# Patient Record
Sex: MALE | Race: OTHER | HISPANIC OR LATINO | URBAN - METROPOLITAN AREA
[De-identification: names, ages, dates, MRNs, and addresses within clinical notes are randomized per-mention and may not be internally consistent; named-entity substitution may affect disease eponyms.]

---

## 2017-11-03 ENCOUNTER — EMERGENCY (EMERGENCY)
Facility: HOSPITAL | Age: 27
LOS: 1 days | Discharge: HOME | End: 2017-11-03
Attending: EMERGENCY MEDICINE | Admitting: EMERGENCY MEDICINE
Payer: MEDICAID

## 2017-11-03 VITALS
RESPIRATION RATE: 18 BRPM | TEMPERATURE: 99 F | OXYGEN SATURATION: 99 % | DIASTOLIC BLOOD PRESSURE: 78 MMHG | HEART RATE: 84 BPM | SYSTOLIC BLOOD PRESSURE: 132 MMHG

## 2017-11-03 DIAGNOSIS — L02.511 CUTANEOUS ABSCESS OF RIGHT HAND: ICD-10-CM

## 2017-11-03 DIAGNOSIS — M79.89 OTHER SPECIFIED SOFT TISSUE DISORDERS: ICD-10-CM

## 2017-11-03 LAB
ALBUMIN SERPL ELPH-MCNC: 4.6 G/DL — SIGNIFICANT CHANGE UP (ref 3.3–5)
ALP SERPL-CCNC: 55 U/L — SIGNIFICANT CHANGE UP (ref 40–120)
ALT FLD-CCNC: 22 U/L RC — SIGNIFICANT CHANGE UP (ref 10–45)
ANION GAP SERPL CALC-SCNC: 15 MMOL/L — SIGNIFICANT CHANGE UP (ref 5–17)
APTT BLD: 26.9 SEC — LOW (ref 27.5–37.4)
AST SERPL-CCNC: 19 U/L — SIGNIFICANT CHANGE UP (ref 10–40)
BILIRUB SERPL-MCNC: 0.3 MG/DL — SIGNIFICANT CHANGE UP (ref 0.2–1.2)
BUN SERPL-MCNC: 12 MG/DL — SIGNIFICANT CHANGE UP (ref 7–23)
CALCIUM SERPL-MCNC: 9.2 MG/DL — SIGNIFICANT CHANGE UP (ref 8.4–10.5)
CHLORIDE SERPL-SCNC: 99 MMOL/L — SIGNIFICANT CHANGE UP (ref 96–108)
CO2 SERPL-SCNC: 25 MMOL/L — SIGNIFICANT CHANGE UP (ref 22–31)
CREAT SERPL-MCNC: 0.8 MG/DL — SIGNIFICANT CHANGE UP (ref 0.5–1.3)
GLUCOSE SERPL-MCNC: 149 MG/DL — HIGH (ref 70–99)
HCT VFR BLD CALC: 37.4 % — LOW (ref 39–50)
HGB BLD-MCNC: 10.8 G/DL — LOW (ref 13–17)
INR BLD: 1.29 RATIO — HIGH (ref 0.88–1.16)
MCHC RBC-ENTMCNC: 28.7 GM/DL — LOW (ref 32–36)
MCHC RBC-ENTMCNC: 30.5 PG — SIGNIFICANT CHANGE UP (ref 27–34)
MCV RBC AUTO: 106 FL — HIGH (ref 80–100)
POTASSIUM SERPL-MCNC: 3.6 MMOL/L — SIGNIFICANT CHANGE UP (ref 3.5–5.3)
POTASSIUM SERPL-SCNC: 3.6 MMOL/L — SIGNIFICANT CHANGE UP (ref 3.5–5.3)
PROT SERPL-MCNC: 8.2 G/DL — SIGNIFICANT CHANGE UP (ref 6–8.3)
PROTHROM AB SERPL-ACNC: 14 SEC — HIGH (ref 9.8–12.7)
RBC # BLD: 3.52 M/UL — LOW (ref 4.2–5.8)
RBC # FLD: 11.2 % — SIGNIFICANT CHANGE UP (ref 10.3–14.5)
SODIUM SERPL-SCNC: 139 MMOL/L — SIGNIFICANT CHANGE UP (ref 135–145)
WBC # BLD: 8.3 K/UL — SIGNIFICANT CHANGE UP (ref 3.8–10.5)
WBC # FLD AUTO: 8.3 K/UL — SIGNIFICANT CHANGE UP (ref 3.8–10.5)

## 2017-11-03 PROCEDURE — 99284 EMERGENCY DEPT VISIT MOD MDM: CPT

## 2017-11-03 PROCEDURE — 73130 X-RAY EXAM OF HAND: CPT | Mod: 26,RT

## 2017-11-03 RX ORDER — MORPHINE SULFATE 50 MG/1
4 CAPSULE, EXTENDED RELEASE ORAL ONCE
Qty: 0 | Refills: 0 | Status: DISCONTINUED | OUTPATIENT
Start: 2017-11-03 | End: 2017-11-03

## 2017-11-03 NOTE — ED PROVIDER NOTE - OBJECTIVE STATEMENT
26 yo R-handed M no PMHx p/w right index finger swelling, erythema x 4 days. Pt pulled an ingrown hair out of the base of his right index finger about 3-4 days ago and the finger progressively became more red, swollen, and tender. It is painful when he tries to extend the digit. He went to an outpatient clinic today that directed him to the [facility]. Basic labs were drawn and IV antibiotics were given (Unasyn and Vancomycin) and pt was transferred to [facility] for further evaluation. Pt was also given the tetanus vaccine. He states he may have had a fever last PM but denies N/V, CP/palpitations, SOB, abd pain. 28 yo R-handed M no PMHx p/w right index finger swelling, erythema x 4 days. Pt pulled an ingrown hair out of the base of his right index finger about 3-4 days ago and the finger progressively became more red, swollen, and tender. It is painful when he tries to extend the digit. He went to an outpatient clinic today that directed him to the [facility]. Basic labs were drawn and IV antibiotics were given (Unasyn and Vancomycin @ 18:00) and pt was transferred to [facility] for further evaluation. Pt was also given the tetanus vaccine. He states he may have had a fever last PM but denies N/V, CP/palpitations, SOB, abd pain. 26 yo R-handed M no PMHx p/w right index finger swelling, erythema x 4 days. Pt pulled an ingrown hair out of the base of his right index finger about 3-4 days ago and the finger progressively became more red, swollen, and tender. It is painful when he tries to extend the digit. He went to an outpatient clinic today that directed him to the [facility]. Basic labs were drawn and IV antibiotics were given (Unasyn and Vancomycin @ 18:00) and pt was transferred to [facility] for further evaluation. Pt was also given the tetanus vaccine. He denies fever, denies N/V, CP/palpitations, SOB, abd pain.

## 2017-11-03 NOTE — ED ADULT NURSE NOTE - OBJECTIVE STATEMENT
27 year old male presented to ED via EMS from [facility] with c/o of right index finger swelling/cellulitis. Pt had vancomycin and ampicillin at [facility]. Pt afebrile, denies chills, dizziness, nausea/vomiting, numbness/tingling, headache. Pt a&ox3, lung sounds clear, heart rate regular, abdomen soft nontender nondistended to palp. Skin on right index finger red, swollen, palpable pulses, limited ROM. IV in left AC 20G and patent placed by [facility]. Will continue to monitor and assess while offering support and reassurance.

## 2017-11-03 NOTE — ED PROVIDER NOTE - PHYSICAL EXAMINATION
Gen: AAOx3, non-toxic  Head: NCAT  HEENT: EOMI, oral mucosa moist, normal conjunctiva  Lung: CTAB, no respiratory distress, no wheezes/rhonchi/rales B/L, speaking in full sentences  CV: RRR, no murmurs, rubs or gallops  Abd: soft, NTND, no guarding  MSK: R index finger tender and erythematous, pain with ROM at MCP and PIP joint, pain with passive extension  Neuro: No focal sensory or motor deficits  Skin: Warm, well perfused, no rash, R hand index finger with circumferential swelling, ulcered wound at base of finger on dorsal side  Psych: normal affect.   ~Anupama Lopez M.D. Resident

## 2017-11-03 NOTE — ED PROVIDER NOTE - NS ED ROS FT
GENERAL: +fevers/chills, EYES: no change in vision, HEENT: no trouble swallowing or speaking, CARDIAC: no chest pain, PULMONARY: no cough or SOB, GI: no abdominal pain, no nausea, no vomiting, no diarrhea or constipation, : No changes in urination, SKIN: no rashes, NEURO: no headache,  MSK: R hand swelling and pain of index finger ~Anupama Lopez M.D. Resident

## 2017-11-03 NOTE — ED PROVIDER NOTE - ATTENDING CONTRIBUTION TO CARE
Attending MD Beyer: I personally have seen and examined this patient.  Resident note reviewed and agree on plan of care and except where noted.  See below for details.     27M with no reported PMH/PSH/Meds/Allergies presents to the ED with R index finger pain and swelling.  R hand dominant.  Reports on Sunday noticed a white bump on the dorsal aspect of hand at base of index finger.  The following day noted start of redness.  Since then worsening.  Reports today decided to go to Urgent Care that sent him to the ED at [facility].  Review of EMR there reveals given TDaP, Unasyn and Vanco and transfer here (accepted by Dr. Guillermo Loyd) for hand evaluation.  Reports pain with attempts at both flexing finger and extending finger.  Denies animal bite or human bite.  Denies trauma or previous episode.  Denies fevers, chills, dizziness, weakness. Denies tracking up the arm, pain with motion of the wrist or elbow.  Reports spontaneous drainage of purulence from area.  Denies chest pain, shortness of breath, palpitations. Denies abdominal pain, nausea, vomiting, diarrhea, blood in stools.  On exam, NAD, NCAT, +S1S2, no m/r/g, lungs CTAB, abdomen soft, NT, +R index finger +tenderness to palpation, erythema over entire finger more pronounced proximal to DIP but including MCP and area around MCP, +tenderness with ROM at MCP and PIP, pain with passive ROM as well, edema circumferentially between PIP and MCP, wound with purulent drainage over the dorsal aspect of finger between MCP and PIP, no active bleeding, cap refill <2 sec, sensory grossly intact; A/P: 27M with abscess and cellulitis of R index fingers, will obtain labs, hand consult, abx, reassess

## 2017-11-03 NOTE — ED PROVIDER NOTE - MEDICAL DECISION MAKING DETAILS
26 yo R-handed M no PMHx p/w right index finger swelling, erythema x 4 days, DDx: dactylitis, flexor tenosynovitis, cellulitis, will obtain basic labs, cultures, X-ray, Hand Consult, reevaluate

## 2017-11-03 NOTE — ED ADULT NURSE NOTE - CHPI ED SYMPTOMS NEG
no fever/no nausea/no vomiting/no dizziness/no decreased eating/drinking/no numbness/no tingling/no weakness/no chills

## 2017-11-03 NOTE — ED PROVIDER NOTE - PROGRESS NOTE DETAILS
Spoke to Dr. Ricardo Loyd, he is aware pt will need to be seen and evaluated in the ED prior to admission.  Anupama Lopez M.D. Resident Dr. Loyd at bedside.  Anupama Lopez M.D. Resident Attending MD Beyer: Discussed completed procedure with Dr. Loyd.  Reports wound was opened with 0.5cc purulence expressed, wound culture sent, packed with 4"x4" gauze.  Recommended 2nd dose of IV antibiotic at 2am (Vanco) and discharge with oral antibiotics Augmentin 500mg BID and follow up by Sunday morning at [facility] for re-evaluation. Attending MD Beyer: Discussed completed procedure with Dr. Loyd.  Reports wound was opened with 0.5cc purulence expressed, wound culture sent, packed with 4"x4" gauze.  Recommended 2nd dose of IV antibiotic at 2am (Vanco) and discharge with oral antibiotics and follow up by Sunday morning at [facility] (or here) for re-evaluation.  Patient to see Dr. Loyd in his office on Monday 11/6/17.  Given patient has only received one dose of Vanco, will instead give Keflex and Bactrim here and send home on same. Follow up instructions given, importance of follow up emphasized, return to ED parameters reviewed and patient verbalized understanding.  All questions answered, all concerns addressed.

## 2017-11-04 VITALS
RESPIRATION RATE: 18 BRPM | OXYGEN SATURATION: 99 % | TEMPERATURE: 99 F | HEART RATE: 89 BPM | SYSTOLIC BLOOD PRESSURE: 123 MMHG | DIASTOLIC BLOOD PRESSURE: 78 MMHG

## 2017-11-04 LAB
BASOPHILS # BLD AUTO: 0 K/UL — SIGNIFICANT CHANGE UP (ref 0–0.2)
BLD GP AB SCN SERPL QL: NEGATIVE — SIGNIFICANT CHANGE UP
CRP SERPL-MCNC: 5.4 MG/DL — HIGH (ref 0–0.4)
EOSINOPHIL # BLD AUTO: 0 K/UL — SIGNIFICANT CHANGE UP (ref 0–0.5)
EOSINOPHIL NFR BLD AUTO: 1 % — SIGNIFICANT CHANGE UP (ref 0–6)
ERYTHROCYTE [SEDIMENTATION RATE] IN BLOOD: 1 MM/HR — SIGNIFICANT CHANGE UP (ref 0–15)
LYMPHOCYTES # BLD AUTO: 0.9 K/UL — LOW (ref 1–3.3)
LYMPHOCYTES # BLD AUTO: 17 % — SIGNIFICANT CHANGE UP (ref 13–44)
MONOCYTES # BLD AUTO: 0.9 K/UL — SIGNIFICANT CHANGE UP (ref 0–0.9)
MONOCYTES NFR BLD AUTO: 8 % — SIGNIFICANT CHANGE UP (ref 2–14)
NEUTROPHILS # BLD AUTO: 6.5 K/UL — SIGNIFICANT CHANGE UP (ref 1.8–7.4)
NEUTROPHILS NFR BLD AUTO: 74 % — SIGNIFICANT CHANGE UP (ref 43–77)
PLATELET # BLD AUTO: 126 K/UL — LOW (ref 150–400)
RH IG SCN BLD-IMP: POSITIVE — SIGNIFICANT CHANGE UP

## 2017-11-04 PROCEDURE — 85027 COMPLETE CBC AUTOMATED: CPT

## 2017-11-04 PROCEDURE — 86901 BLOOD TYPING SEROLOGIC RH(D): CPT

## 2017-11-04 PROCEDURE — 87070 CULTURE OTHR SPECIMN AEROBIC: CPT

## 2017-11-04 PROCEDURE — 80053 COMPREHEN METABOLIC PANEL: CPT

## 2017-11-04 PROCEDURE — 87040 BLOOD CULTURE FOR BACTERIA: CPT

## 2017-11-04 PROCEDURE — 86900 BLOOD TYPING SEROLOGIC ABO: CPT

## 2017-11-04 PROCEDURE — 85610 PROTHROMBIN TIME: CPT

## 2017-11-04 PROCEDURE — 96374 THER/PROPH/DIAG INJ IV PUSH: CPT

## 2017-11-04 PROCEDURE — 87186 SC STD MICRODIL/AGAR DIL: CPT

## 2017-11-04 PROCEDURE — 73130 X-RAY EXAM OF HAND: CPT

## 2017-11-04 PROCEDURE — 85730 THROMBOPLASTIN TIME PARTIAL: CPT

## 2017-11-04 PROCEDURE — 99284 EMERGENCY DEPT VISIT MOD MDM: CPT | Mod: 25

## 2017-11-04 PROCEDURE — 86140 C-REACTIVE PROTEIN: CPT

## 2017-11-04 PROCEDURE — 85652 RBC SED RATE AUTOMATED: CPT

## 2017-11-04 PROCEDURE — 86850 RBC ANTIBODY SCREEN: CPT

## 2017-11-04 RX ORDER — CEPHALEXIN 500 MG
1 CAPSULE ORAL
Qty: 40 | Refills: 0 | OUTPATIENT
Start: 2017-11-04 | End: 2017-11-14

## 2017-11-04 RX ORDER — AZTREONAM 2 G
1 VIAL (EA) INJECTION
Qty: 20 | Refills: 0 | OUTPATIENT
Start: 2017-11-04 | End: 2017-11-14

## 2017-11-04 RX ORDER — CEPHALEXIN 500 MG
500 CAPSULE ORAL ONCE
Qty: 0 | Refills: 0 | Status: COMPLETED | OUTPATIENT
Start: 2017-11-04 | End: 2017-11-04

## 2017-11-04 RX ADMIN — Medication 500 MILLIGRAM(S): at 00:38

## 2017-11-04 RX ADMIN — MORPHINE SULFATE 4 MILLIGRAM(S): 50 CAPSULE, EXTENDED RELEASE ORAL at 00:03

## 2017-11-04 RX ADMIN — MORPHINE SULFATE 4 MILLIGRAM(S): 50 CAPSULE, EXTENDED RELEASE ORAL at 00:24

## 2017-11-04 RX ADMIN — Medication 1 TABLET(S): at 00:38

## 2017-11-04 NOTE — ED POST DISCHARGE NOTE - RESULT SUMMARY
finger swab- abdirahman ramachandran; pt admitted to [facility] and seen by ID with active treatement with Vancomycin and ID aware of the cx results per consult note  -MERY Vargas

## 2017-11-04 NOTE — ED ADULT NURSE REASSESSMENT NOTE - NS ED NURSE REASSESS COMMENT FT1
hand surgery assessed patient and drained right hand. Pt medicated with pain medication w/ relief. Discharge information reviewed with patient.

## 2017-11-04 NOTE — ED POST DISCHARGE NOTE - REASON FOR FOLLOW-UP
Other Elevated CRP.  Pt had an I and D by Dr Loyd feeling much better at this time on keflex and bactrim, encouraged follow up with Dr Loyd.  Yasmany Culture Follow-up/Other

## 2017-11-05 ENCOUNTER — INPATIENT (INPATIENT)
Facility: HOSPITAL | Age: 27
LOS: 2 days | Discharge: HOME | DRG: 603 | End: 2017-11-08
Attending: INTERNAL MEDICINE | Admitting: INTERNAL MEDICINE
Payer: MEDICAID

## 2017-11-05 VITALS
RESPIRATION RATE: 20 BRPM | SYSTOLIC BLOOD PRESSURE: 106 MMHG | HEART RATE: 95 BPM | DIASTOLIC BLOOD PRESSURE: 66 MMHG | OXYGEN SATURATION: 98 % | TEMPERATURE: 99 F

## 2017-11-05 DIAGNOSIS — L03.90 CELLULITIS, UNSPECIFIED: ICD-10-CM

## 2017-11-05 LAB
ALBUMIN SERPL ELPH-MCNC: 4.8 G/DL — SIGNIFICANT CHANGE UP (ref 3.3–5)
ALP SERPL-CCNC: 56 U/L — SIGNIFICANT CHANGE UP (ref 40–120)
ALT FLD-CCNC: 32 U/L RC — SIGNIFICANT CHANGE UP (ref 10–45)
ANION GAP SERPL CALC-SCNC: 16 MMOL/L — SIGNIFICANT CHANGE UP (ref 5–17)
APTT BLD: 28.5 SEC — SIGNIFICANT CHANGE UP (ref 27.5–37.4)
AST SERPL-CCNC: 43 U/L — HIGH (ref 10–40)
BASOPHILS # BLD AUTO: 0 K/UL — SIGNIFICANT CHANGE UP (ref 0–0.2)
BASOPHILS NFR BLD AUTO: 0.4 % — SIGNIFICANT CHANGE UP (ref 0–2)
BILIRUB SERPL-MCNC: 0.5 MG/DL — SIGNIFICANT CHANGE UP (ref 0.2–1.2)
BLD GP AB SCN SERPL QL: NEGATIVE — SIGNIFICANT CHANGE UP
BUN SERPL-MCNC: 13 MG/DL — SIGNIFICANT CHANGE UP (ref 7–23)
CALCIUM SERPL-MCNC: 9.2 MG/DL — SIGNIFICANT CHANGE UP (ref 8.4–10.5)
CHLORIDE SERPL-SCNC: 99 MMOL/L — SIGNIFICANT CHANGE UP (ref 96–108)
CO2 SERPL-SCNC: 26 MMOL/L — SIGNIFICANT CHANGE UP (ref 22–31)
CREAT SERPL-MCNC: 0.96 MG/DL — SIGNIFICANT CHANGE UP (ref 0.5–1.3)
EOSINOPHIL # BLD AUTO: 0 K/UL — SIGNIFICANT CHANGE UP (ref 0–0.5)
EOSINOPHIL NFR BLD AUTO: 0.1 % — SIGNIFICANT CHANGE UP (ref 0–6)
GLUCOSE SERPL-MCNC: 105 MG/DL — HIGH (ref 70–99)
HCT VFR BLD CALC: 46.5 % — SIGNIFICANT CHANGE UP (ref 39–50)
HGB BLD-MCNC: 15.8 G/DL — SIGNIFICANT CHANGE UP (ref 13–17)
INR BLD: 1.33 RATIO — HIGH (ref 0.88–1.16)
LYMPHOCYTES # BLD AUTO: 1.6 K/UL — SIGNIFICANT CHANGE UP (ref 1–3.3)
LYMPHOCYTES # BLD AUTO: 18.9 % — SIGNIFICANT CHANGE UP (ref 13–44)
MCHC RBC-ENTMCNC: 30.6 PG — SIGNIFICANT CHANGE UP (ref 27–34)
MCHC RBC-ENTMCNC: 34 GM/DL — SIGNIFICANT CHANGE UP (ref 32–36)
MCV RBC AUTO: 90 FL — SIGNIFICANT CHANGE UP (ref 80–100)
MONOCYTES # BLD AUTO: 0.8 K/UL — SIGNIFICANT CHANGE UP (ref 0–0.9)
MONOCYTES NFR BLD AUTO: 9.3 % — SIGNIFICANT CHANGE UP (ref 2–14)
NEUTROPHILS # BLD AUTO: 6.1 K/UL — SIGNIFICANT CHANGE UP (ref 1.8–7.4)
NEUTROPHILS NFR BLD AUTO: 71.3 % — SIGNIFICANT CHANGE UP (ref 43–77)
PLATELET # BLD AUTO: 294 K/UL — SIGNIFICANT CHANGE UP (ref 150–400)
POTASSIUM SERPL-MCNC: 3.5 MMOL/L — SIGNIFICANT CHANGE UP (ref 3.5–5.3)
POTASSIUM SERPL-SCNC: 3.5 MMOL/L — SIGNIFICANT CHANGE UP (ref 3.5–5.3)
PROT SERPL-MCNC: 8.5 G/DL — HIGH (ref 6–8.3)
PROTHROM AB SERPL-ACNC: 14.5 SEC — HIGH (ref 9.8–12.7)
RBC # BLD: 5.16 M/UL — SIGNIFICANT CHANGE UP (ref 4.2–5.8)
RBC # FLD: 10.7 % — SIGNIFICANT CHANGE UP (ref 10.3–14.5)
RH IG SCN BLD-IMP: POSITIVE — SIGNIFICANT CHANGE UP
SODIUM SERPL-SCNC: 141 MMOL/L — SIGNIFICANT CHANGE UP (ref 135–145)
WBC # BLD: 8.6 K/UL — SIGNIFICANT CHANGE UP (ref 3.8–10.5)
WBC # FLD AUTO: 8.6 K/UL — SIGNIFICANT CHANGE UP (ref 3.8–10.5)

## 2017-11-05 PROCEDURE — 73130 X-RAY EXAM OF HAND: CPT | Mod: 26,RT

## 2017-11-05 PROCEDURE — 99285 EMERGENCY DEPT VISIT HI MDM: CPT

## 2017-11-05 RX ORDER — MORPHINE SULFATE 50 MG/1
2 CAPSULE, EXTENDED RELEASE ORAL ONCE
Qty: 0 | Refills: 0 | Status: DISCONTINUED | OUTPATIENT
Start: 2017-11-05 | End: 2017-11-05

## 2017-11-05 RX ORDER — ACETAMINOPHEN 500 MG
650 TABLET ORAL EVERY 6 HOURS
Qty: 0 | Refills: 0 | Status: DISCONTINUED | OUTPATIENT
Start: 2017-11-05 | End: 2017-11-08

## 2017-11-05 RX ORDER — ACETAMINOPHEN 500 MG
1000 TABLET ORAL ONCE
Qty: 0 | Refills: 0 | Status: COMPLETED | OUTPATIENT
Start: 2017-11-05 | End: 2017-11-05

## 2017-11-05 RX ORDER — VANCOMYCIN HCL 1 G
1000 VIAL (EA) INTRAVENOUS EVERY 12 HOURS
Qty: 0 | Refills: 0 | Status: DISCONTINUED | OUTPATIENT
Start: 2017-11-05 | End: 2017-11-06

## 2017-11-05 RX ORDER — SODIUM CHLORIDE 9 MG/ML
1000 INJECTION INTRAMUSCULAR; INTRAVENOUS; SUBCUTANEOUS ONCE
Qty: 0 | Refills: 0 | Status: COMPLETED | OUTPATIENT
Start: 2017-11-05 | End: 2017-11-05

## 2017-11-05 RX ADMIN — MORPHINE SULFATE 2 MILLIGRAM(S): 50 CAPSULE, EXTENDED RELEASE ORAL at 15:00

## 2017-11-05 RX ADMIN — MORPHINE SULFATE 2 MILLIGRAM(S): 50 CAPSULE, EXTENDED RELEASE ORAL at 19:03

## 2017-11-05 RX ADMIN — Medication 1000 MILLIGRAM(S): at 16:25

## 2017-11-05 RX ADMIN — Medication 250 MILLIGRAM(S): at 22:56

## 2017-11-05 RX ADMIN — Medication 400 MILLIGRAM(S): at 15:00

## 2017-11-05 RX ADMIN — SODIUM CHLORIDE 1000 MILLILITER(S): 9 INJECTION INTRAMUSCULAR; INTRAVENOUS; SUBCUTANEOUS at 15:00

## 2017-11-05 RX ADMIN — MORPHINE SULFATE 2 MILLIGRAM(S): 50 CAPSULE, EXTENDED RELEASE ORAL at 19:37

## 2017-11-05 RX ADMIN — MORPHINE SULFATE 2 MILLIGRAM(S): 50 CAPSULE, EXTENDED RELEASE ORAL at 16:25

## 2017-11-05 NOTE — H&P ADULT - PROBLEM SELECTOR PLAN 1
Failed out patient oral therapy with a limb threatening infection. Will need ID evaluation in AM. Continue empiric Vanco for now to cover MRSA

## 2017-11-05 NOTE — ED ADULT NURSE NOTE - OBJECTIVE STATEMENT
28 yo male transfer from [facility] for cellulitis/abscess to right infex finger. 26 yo male transfer from [facility] for cellulitis/abscess to right index finger. Pt was in the ER 2 days ago for the same chief complaint following a visit to [facility]. He received IV antibiotics at [facility] and then was transferred to [facility] for further evaluation. Pt was discharged home from the ER following an I&D and packing and a prescription for PO antibiotics. Pt returned to [facility] today for wound check and was sent to the [facility] again for further evaluation and red streaking on his arm. Pt received one dose of IV Zosyn and Vancomycin at 11 am at [facility]

## 2017-11-05 NOTE — H&P ADULT - HISTORY OF PRESENT ILLNESS
26 yo R-handed M no PMHx p/w R index finger swelling x 6 days. Pt was in the ER 2 days ago for the same chief complaint following a visit to [facility]. He received IV antibiotics at [facility] and then was transferred to [facility] for further evaluation. Pt was discharged home from the ER following an I&D and packing and a prescription for PO antibiotics. Pt returned to [facility] today for wound check and was sent to the [facility] again for further evaluation and red streaking on his arm. Pt received one dose of IV Zosyn and Vancomycin at 11 am at [facility]. Denies fevers/chills,

## 2017-11-05 NOTE — ED PROVIDER NOTE - NS ED ROS FT
GENERAL: No fever or chills, EYES: no change in vision, HEENT: no trouble swallowing or speaking, CARDIAC: no chest pain, PULMONARY: no cough or SOB, GI: no abdominal pain, no nausea, no vomiting, no diarrhea or constipation, : No changes in urination, SKIN: no rashes, NEURO: no headache,  MSK: R index finger swelling, redness, pain ~Anupama Lopez M.D. Resident

## 2017-11-05 NOTE — ED PROVIDER NOTE - CARE PLAN
Principal Discharge DX:	Cellulitis and abscess Principal Discharge DX:	Cellulitis and abscess  Goal:	R 2nd finger

## 2017-11-05 NOTE — ED PROVIDER NOTE - OBJECTIVE STATEMENT
28 yo R-handed M no PMHx p/w R index finger swelling x 6 days. Pt was in the ER 2 days ago for the same chief complaint following a visit to [facility]. He received IV antibiotics at [facility] and then was transferred to [facility] for further evaluation. Pt was discharged home from the ER following an I&D and a prescription 28 yo R-handed M no PMHx p/w R index finger swelling x 6 days. Pt was in the ER 2 days ago for the same chief complaint following a visit to [facility]. He received IV antibiotics at [facility] and then was transferred to [facility] for further evaluation. Pt was discharged home from the ER following an I&D and packing and a prescription for PO antibiotics. Pt returned to [facility] today for wound check and was sent to the [facility] again for further evaluation and red streaking on his arm. 28 yo R-handed M no PMHx p/w R index finger swelling x 6 days. Pt was in the ER 2 days ago for the same chief complaint following a visit to [facility]. He received IV antibiotics at [facility] and then was transferred to [facility] for further evaluation. Pt was discharged home from the ER following an I&D and packing and a prescription for PO antibiotics. Pt returned to [facility] today for wound check and was sent to the [facility] again for further evaluation and red streaking on his arm. Pt received one dose of IV Zosyn and Vancomycin at 11 am at [facility]. Denies fevers/chills, CP/palpitations, SOB, N/V.

## 2017-11-05 NOTE — ED PROVIDER NOTE - MEDICAL DECISION MAKING DETAILS
28 yo R-handed M no PMHx p/w R index finger swelling, erythema and tenderness x 6 days with failed PO antibiotics outpatient, will obtain basic labs, blood cultures, Xray, IVF, hand consult, admit for IV abx 26 yo R-handed M no PMHx p/w R index finger swelling, erythema and tenderness x 6 days with failed PO antibiotics outpatient, will obtain basic labs, blood cultures, Xray, IVF, hand consult, admit for IV abx  Attending Statement: Agree with the above.  Finger cellulitis s/p abscess I&D, symptoms now somewhat concerning for septic 2nd MCP.  Already abx at OSH.  Hand surgery cs, admission, XR.  Pain control.  --ELMERM

## 2017-11-05 NOTE — ED PROVIDER NOTE - PHYSICAL EXAMINATION
Gen: AAOx3, non-toxic  Head: NCAT  HEENT: EOMI, oral mucosa moist, normal conjunctiva  Lung: CTAB, no respiratory distress, no wheezes/rhonchi/rales B/L, speaking in full sentences  CV: RRR, no murmurs, rubs or gallops  Abd: soft, NTND, no guarding, no CVA tenderness  MSK: R index finger swelling and erythema over the MCP joint and proximal digit with lymphatic spread with 2 red streaking lines over the volar aspect of his arm, full ROM of index finger,   Neuro: No focal sensory or motor deficits  Skin: Warm, well perfused, no rash  Psych: normal affect.   ~Anupama Lopez M.D. Resident

## 2017-11-06 DIAGNOSIS — R70.0 ELEVATED ERYTHROCYTE SEDIMENTATION RATE: ICD-10-CM

## 2017-11-06 DIAGNOSIS — R79.82 ELEVATED C-REACTIVE PROTEIN (CRP): ICD-10-CM

## 2017-11-06 DIAGNOSIS — A49.01 METHICILLIN SUSCEPTIBLE STAPHYLOCOCCUS AUREUS INFECTION, UNSPECIFIED SITE: ICD-10-CM

## 2017-11-06 LAB
-  AMPICILLIN/SULBACTAM: SIGNIFICANT CHANGE UP
-  CEFAZOLIN: SIGNIFICANT CHANGE UP
-  CIPROFLOXACIN: SIGNIFICANT CHANGE UP
-  CLINDAMYCIN: SIGNIFICANT CHANGE UP
-  ERYTHROMYCIN: SIGNIFICANT CHANGE UP
-  GENTAMICIN: SIGNIFICANT CHANGE UP
-  LEVOFLOXACIN: SIGNIFICANT CHANGE UP
-  MOXIFLOXACIN(AEROBIC): SIGNIFICANT CHANGE UP
-  OXACILLIN: SIGNIFICANT CHANGE UP
-  PENICILLIN: SIGNIFICANT CHANGE UP
-  RIFAMPIN: SIGNIFICANT CHANGE UP
-  TETRACYCLINE: SIGNIFICANT CHANGE UP
-  TRIMETHOPRIM/SULFAMETHOXAZOLE: SIGNIFICANT CHANGE UP
-  VANCOMYCIN: SIGNIFICANT CHANGE UP
ANION GAP SERPL CALC-SCNC: 12 MMOL/L — SIGNIFICANT CHANGE UP (ref 5–17)
BASOPHILS # BLD AUTO: 0 K/UL — SIGNIFICANT CHANGE UP (ref 0–0.2)
BASOPHILS NFR BLD AUTO: 0.7 % — SIGNIFICANT CHANGE UP (ref 0–2)
BUN SERPL-MCNC: 13 MG/DL — SIGNIFICANT CHANGE UP (ref 7–23)
CALCIUM SERPL-MCNC: 8.9 MG/DL — SIGNIFICANT CHANGE UP (ref 8.4–10.5)
CHLORIDE SERPL-SCNC: 102 MMOL/L — SIGNIFICANT CHANGE UP (ref 96–108)
CO2 SERPL-SCNC: 25 MMOL/L — SIGNIFICANT CHANGE UP (ref 22–31)
CREAT SERPL-MCNC: 0.85 MG/DL — SIGNIFICANT CHANGE UP (ref 0.5–1.3)
CRP SERPL-MCNC: 5.3 MG/DL — HIGH (ref 0–0.4)
EOSINOPHIL # BLD AUTO: 0.2 K/UL — SIGNIFICANT CHANGE UP (ref 0–0.5)
EOSINOPHIL NFR BLD AUTO: 3 % — SIGNIFICANT CHANGE UP (ref 0–6)
GLUCOSE SERPL-MCNC: 100 MG/DL — HIGH (ref 70–99)
HCT VFR BLD CALC: 42.2 % — SIGNIFICANT CHANGE UP (ref 39–50)
HGB BLD-MCNC: 14.6 G/DL — SIGNIFICANT CHANGE UP (ref 13–17)
LYMPHOCYTES # BLD AUTO: 1.9 K/UL — SIGNIFICANT CHANGE UP (ref 1–3.3)
LYMPHOCYTES # BLD AUTO: 30.9 % — SIGNIFICANT CHANGE UP (ref 13–44)
MCHC RBC-ENTMCNC: 31.4 PG — SIGNIFICANT CHANGE UP (ref 27–34)
MCHC RBC-ENTMCNC: 34.5 GM/DL — SIGNIFICANT CHANGE UP (ref 32–36)
MCV RBC AUTO: 90.8 FL — SIGNIFICANT CHANGE UP (ref 80–100)
METHOD TYPE: SIGNIFICANT CHANGE UP
MONOCYTES # BLD AUTO: 0.7 K/UL — SIGNIFICANT CHANGE UP (ref 0–0.9)
MONOCYTES NFR BLD AUTO: 11 % — SIGNIFICANT CHANGE UP (ref 2–14)
NEUTROPHILS # BLD AUTO: 3.4 K/UL — SIGNIFICANT CHANGE UP (ref 1.8–7.4)
NEUTROPHILS NFR BLD AUTO: 54.3 % — SIGNIFICANT CHANGE UP (ref 43–77)
PLATELET # BLD AUTO: 289 K/UL — SIGNIFICANT CHANGE UP (ref 150–400)
POTASSIUM SERPL-MCNC: 3.9 MMOL/L — SIGNIFICANT CHANGE UP (ref 3.5–5.3)
POTASSIUM SERPL-SCNC: 3.9 MMOL/L — SIGNIFICANT CHANGE UP (ref 3.5–5.3)
RBC # BLD: 4.64 M/UL — SIGNIFICANT CHANGE UP (ref 4.2–5.8)
RBC # FLD: 10.7 % — SIGNIFICANT CHANGE UP (ref 10.3–14.5)
SODIUM SERPL-SCNC: 139 MMOL/L — SIGNIFICANT CHANGE UP (ref 135–145)
WBC # BLD: 6.3 K/UL — SIGNIFICANT CHANGE UP (ref 3.8–10.5)
WBC # FLD AUTO: 6.3 K/UL — SIGNIFICANT CHANGE UP (ref 3.8–10.5)

## 2017-11-06 PROCEDURE — 99253 IP/OBS CNSLTJ NEW/EST LOW 45: CPT

## 2017-11-06 PROCEDURE — 73220 MRI UPPR EXTREMITY W/O&W/DYE: CPT | Mod: 26,RT

## 2017-11-06 RX ORDER — PANTOPRAZOLE SODIUM 20 MG/1
40 TABLET, DELAYED RELEASE ORAL
Qty: 0 | Refills: 0 | Status: DISCONTINUED | OUTPATIENT
Start: 2017-11-06 | End: 2017-11-08

## 2017-11-06 RX ORDER — VANCOMYCIN HCL 1 G
1000 VIAL (EA) INTRAVENOUS EVERY 8 HOURS
Qty: 0 | Refills: 0 | Status: DISCONTINUED | OUTPATIENT
Start: 2017-11-06 | End: 2017-11-07

## 2017-11-06 RX ORDER — OXYCODONE AND ACETAMINOPHEN 5; 325 MG/1; MG/1
1 TABLET ORAL ONCE
Qty: 0 | Refills: 0 | Status: DISCONTINUED | OUTPATIENT
Start: 2017-11-06 | End: 2017-11-06

## 2017-11-06 RX ADMIN — PANTOPRAZOLE SODIUM 40 MILLIGRAM(S): 20 TABLET, DELAYED RELEASE ORAL at 13:11

## 2017-11-06 RX ADMIN — OXYCODONE AND ACETAMINOPHEN 1 TABLET(S): 5; 325 TABLET ORAL at 13:11

## 2017-11-06 RX ADMIN — Medication 250 MILLIGRAM(S): at 09:11

## 2017-11-06 RX ADMIN — OXYCODONE AND ACETAMINOPHEN 1 TABLET(S): 5; 325 TABLET ORAL at 14:11

## 2017-11-06 RX ADMIN — Medication 250 MILLIGRAM(S): at 17:01

## 2017-11-06 NOTE — PROGRESS NOTE ADULT - SUBJECTIVE AND OBJECTIVE BOX
Patient is a 27y old  Male who presents with a chief complaint of Cellulitis (05 Nov 2017 22:19)      SUBJECTIVE / OVERNIGHT EVENTS:  Pain in right index finger + Finger dressed  Review of Systems:   CONSTITUTIONAL: No fever, weight loss, or fatigue  EYES: No eye pain, visual disturbances, or discharge  ENMT:  No difficulty hearing, tinnitus, vertigo; No sinus or throat pain  NECK: No pain or stiffness  BREASTS: No pain, masses, or nipple discharge  RESPIRATORY: No cough, wheezing, chills or hemoptysis; No shortness of breath  CARDIOVASCULAR: No chest pain, palpitations, dizziness, or leg swelling  GASTROINTESTINAL: No abdominal or epigastric pain. No nausea, vomiting, or hematemesis; No diarrhea or constipation. No melena or hematochezia.  GENITOURINARY: No dysuria, frequency, hematuria, or incontinence  NEUROLOGICAL: No headaches, memory loss, loss of strength, numbness, or tremors  LYMPH NODES: No enlarged glands  ENDOCRINE: No heat or cold intolerance; No hair loss  MUSCULOSKELETAL: No joint pain or swelling; No muscle, back, or extremity pain  PSYCHIATRIC: No depression, anxiety, mood swings, or difficulty sleeping  HEME/LYMPH: No easy bruising, or bleeding gums  ALLERY AND IMMUNOLOGIC: No hives or eczema    MEDICATIONS  (STANDING):  vancomycin  IVPB 1000 milliGRAM(s) IV Intermittent every 12 hours    MEDICATIONS  (PRN):  acetaminophen   Tablet 650 milliGRAM(s) Oral every 6 hours PRN For Temp greater than 38 C (100.4 F)      PHYSICAL EXAM:  Vital Signs Last 24 Hrs  T(C): 36.6 (06 Nov 2017 05:16), Max: 37.3 (05 Nov 2017 13:47)  T(F): 97.9 (06 Nov 2017 05:16), Max: 99.1 (05 Nov 2017 13:47)  HR: 77 (06 Nov 2017 05:16) (77 - 95)  BP: 101/65 (06 Nov 2017 05:16) (101/65 - 123/81)  BP(mean): --  RR: 18 (06 Nov 2017 05:16) (16 - 20)  SpO2: 99% (06 Nov 2017 05:16) (98% - 100%)  I&O's Summary    05 Nov 2017 07:01  -  06 Nov 2017 07:00  --------------------------------------------------------  IN: 490 mL / OUT: 0 mL / NET: 490 mL    06 Nov 2017 07:01  -  06 Nov 2017 10:46  --------------------------------------------------------  IN: 370 mL / OUT: 0 mL / NET: 370 mL      GENERAL: NAD, well-developed  HEAD:  Atraumatic, Normocephalic  EYES: EOMI, PERRLA, conjunctiva and sclera clear  NECK: Supple, No JVD  CHEST/LUNG: Clear to auscultation bilaterally; No wheeze  HEART: Regular rate and rhythm; No murmurs, rubs, or gallops  ABDOMEN: Soft, Nontender, Nondistended; Bowel sounds present  EXTREMITIES:  2+ Peripheral Pulses, No clubbing, cyanosis, or edema  PSYCH: AAOx3  NEUROLOGY: non-focal  SKIN: No rashes or lesions    LABS:  CAPILLARY BLOOD GLUCOSE                              14.6   6.3   )-----------( 289      ( 06 Nov 2017 06:19 )             42.2     11-06    139  |  102  |  13  ----------------------------<  100<H>  3.9   |  25  |  0.85    Ca    8.9      06 Nov 2017 06:19    TPro  8.5<H>  /  Alb  4.8  /  TBili  0.5  /  DBili  x   /  AST  43<H>  /  ALT  32  /  AlkPhos  56  11-05    PT/INR - ( 05 Nov 2017 14:58 )   PT: 14.5 sec;   INR: 1.33 ratio         PTT - ( 05 Nov 2017 14:58 )  PTT:28.5 sec          RADIOLOGY & ADDITIONAL TESTS:    Imaging Personally Reviewed:    Consultant(s) Notes Reviewed:      Care Discussed with Consultants/Other Providers:

## 2017-11-06 NOTE — CONSULT NOTE ADULT - ASSESSMENT
26 yo R-handed M no PMHx p/w R index finger swelling x 6 days with finger cellulitis with elevated ESR and CRP with staph aureus infection

## 2017-11-06 NOTE — CONSULT NOTE ADULT - SUBJECTIVE AND OBJECTIVE BOX
SULTANAARACELI 27y Male  MRN-[id number]    Patient is a 27y old  Male who presents with a chief complaint of Cellulitis (05 Nov 2017 22:19)      HPI:  26 yo R-handed M no PMHx p/w R index finger swelling x 6 days. Pt was in the ER 2 days ago for the same chief complaint following a visit to [facility]. He received IV antibiotics at [facility] and then was transferred to [facility] for further evaluation. Pt was discharged home from the ER following an I&D and packing and a prescription for PO antibiotics. Pt returned to [facility] today for wound check and was sent to the [facility] again for further evaluation and red streaking on his arm. Pt received one dose of IV Zosyn and Vancomycin at 11 am at [facility]. Denies fevers/chills, (05 Nov 2017 22:19)      PAST MEDICAL & SURGICAL HISTORY:  Abscess  No significant past surgical history      Allergies    No Known Allergies    Intolerances        ANTIMICROBIALS:  vancomycin  IVPB 1000 every 12 hours      MEDICATIONS  (STANDING):  vancomycin  IVPB 1000 milliGRAM(s) IV Intermittent every 12 hours      Social History  Smoking:  Etoh:  Drug use:      FAMILY HISTORY:  No pertinent family history in first degree relatives      Vital Signs Last 24 Hrs  T(C): 36.6 (06 Nov 2017 05:16), Max: 37.3 (05 Nov 2017 13:47)  T(F): 97.9 (06 Nov 2017 05:16), Max: 99.1 (05 Nov 2017 13:47)  HR: 77 (06 Nov 2017 05:16) (77 - 95)  BP: 101/65 (06 Nov 2017 05:16) (101/65 - 123/81)  BP(mean): --  RR: 18 (06 Nov 2017 05:16) (16 - 20)  SpO2: 99% (06 Nov 2017 05:16) (98% - 100%)    CBC Full  -  ( 06 Nov 2017 06:19 )  WBC Count : 6.3 K/uL  Hemoglobin : 14.6 g/dL  Hematocrit : 42.2 %  Platelet Count - Automated : 289 K/uL  Mean Cell Volume : 90.8 fl  Mean Cell Hemoglobin : 31.4 pg  Mean Cell Hemoglobin Concentration : 34.5 gm/dL  Auto Neutrophil # : 3.4 K/uL  Auto Lymphocyte # : 1.9 K/uL  Auto Monocyte # : 0.7 K/uL  Auto Eosinophil # : 0.2 K/uL  Auto Basophil # : 0.0 K/uL  Auto Neutrophil % : 54.3 %  Auto Lymphocyte % : 30.9 %  Auto Monocyte % : 11.0 %  Auto Eosinophil % : 3.0 %  Auto Basophil % : 0.7 %    11-06    139  |  102  |  13  ----------------------------<  100<H>  3.9   |  25  |  0.85    Ca    8.9      06 Nov 2017 06:19    TPro  8.5<H>  /  Alb  4.8  /  TBili  0.5  /  DBili  x   /  AST  43<H>  /  ALT  32  /  AlkPhos  56  11-05    LIVER FUNCTIONS - ( 05 Nov 2017 14:58 )  Alb: 4.8 g/dL / Pro: 8.5 g/dL / ALK PHOS: 56 U/L / ALT: 32 U/L RC / AST: 43 U/L / GGT: x               MICROBIOLOGY:  .Surgical Swab R index finger  11-04-17   Moderate Staphylococcus aureus  --  --      .Blood Blood  11-04-17   No growth to date.  --  --              v              RADIOLOGY SULTANAARACELI 27y Male  MRN-[id number]    Patient is a 27y old  Male who presents with a chief complaint of Cellulitis (05 Nov 2017 22:19)      HPI:  28 yo R-handed M no PMHx p/w R index finger swelling x 6 days. Pt was in the ER 2 days ago for the same chief complaint following a visit to [facility]. He received IV antibiotics at [facility] and then was transferred to [facility] for further evaluation. Pt was discharged home from the ER following an I&D and packing and a prescription for PO antibiotics. Pt returned to [facility] today for wound check and was sent to the [facility] again for further evaluation and red streaking on his arm. Pt received one dose of IV Zosyn and Vancomycin at 11 am at [facility]. Denies fevers/chills, (05 Nov 2017 22:19)    No fevers. Denies trauma or bites or pets      PAST MEDICAL & SURGICAL HISTORY:  Abscess  No significant past surgical history      Allergies    No Known Allergies    Intolerances        ANTIMICROBIALS:  vancomycin  IVPB 1000 every 12 hours      MEDICATIONS  (STANDING):  vancomycin  IVPB 1000 milliGRAM(s) IV Intermittent every 12 hours      Social History  Smoking: no  Etoh: no  Drug use: no  From [facility]       FAMILY HISTORY:  No pertinent family history in first degree relatives      Vital Signs Last 24 Hrs  T(C): 36.6 (06 Nov 2017 05:16), Max: 37.3 (05 Nov 2017 13:47)  T(F): 97.9 (06 Nov 2017 05:16), Max: 99.1 (05 Nov 2017 13:47)  HR: 77 (06 Nov 2017 05:16) (77 - 95)  BP: 101/65 (06 Nov 2017 05:16) (101/65 - 123/81)  BP(mean): --  RR: 18 (06 Nov 2017 05:16) (16 - 20)  SpO2: 99% (06 Nov 2017 05:16) (98% - 100%)    CBC Full  -  ( 06 Nov 2017 06:19 )  WBC Count : 6.3 K/uL  Hemoglobin : 14.6 g/dL  Hematocrit : 42.2 %  Platelet Count - Automated : 289 K/uL  Mean Cell Volume : 90.8 fl  Mean Cell Hemoglobin : 31.4 pg  Mean Cell Hemoglobin Concentration : 34.5 gm/dL  Auto Neutrophil # : 3.4 K/uL  Auto Lymphocyte # : 1.9 K/uL  Auto Monocyte # : 0.7 K/uL  Auto Eosinophil # : 0.2 K/uL  Auto Basophil # : 0.0 K/uL  Auto Neutrophil % : 54.3 %  Auto Lymphocyte % : 30.9 %  Auto Monocyte % : 11.0 %  Auto Eosinophil % : 3.0 %  Auto Basophil % : 0.7 %    11-06    139  |  102  |  13  ----------------------------<  100<H>  3.9   |  25  |  0.85    Ca    8.9      06 Nov 2017 06:19    TPro  8.5<H>  /  Alb  4.8  /  TBili  0.5  /  DBili  x   /  AST  43<H>  /  ALT  32  /  AlkPhos  56  11-05    LIVER FUNCTIONS - ( 05 Nov 2017 14:58 )  Alb: 4.8 g/dL / Pro: 8.5 g/dL / ALK PHOS: 56 U/L / ALT: 32 U/L RC / AST: 43 U/L / GGT: x               MICROBIOLOGY:  .Surgical Swab R index finger  11-04-17   Moderate Staphylococcus aureus  --  --      .Blood Blood  11-04-17   No growth to date.  --  --    RADIOLOGY    Xray Hand 3 Views, Right (11.05.17 @ 14:46) >  There is swelling of the soft tissues of the proximal radial right second   finger with overlying external bandaging. There is no cortical erosion or   periosteal new bone formation to suspect osteomyelitis. There is no acute   fracture or dislocation.

## 2017-11-06 NOTE — CONSULT NOTE ADULT - ATTENDING COMMENTS
Richardson Sy  Attending Physician   Division of Infectious Disease  Pager #[phone]  After 5pm/weekend #[phone]

## 2017-11-07 LAB — VANCOMYCIN TROUGH SERPL-MCNC: 6.7 UG/ML — LOW (ref 10–20)

## 2017-11-07 PROCEDURE — 93971 EXTREMITY STUDY: CPT | Mod: 26

## 2017-11-07 PROCEDURE — 99232 SBSQ HOSP IP/OBS MODERATE 35: CPT

## 2017-11-07 RX ORDER — CEFAZOLIN SODIUM 1 G
1000 VIAL (EA) INJECTION EVERY 8 HOURS
Qty: 0 | Refills: 0 | Status: DISCONTINUED | OUTPATIENT
Start: 2017-11-07 | End: 2017-11-08

## 2017-11-07 RX ADMIN — Medication 100 MILLIGRAM(S): at 21:44

## 2017-11-07 RX ADMIN — Medication 250 MILLIGRAM(S): at 02:27

## 2017-11-07 RX ADMIN — Medication 250 MILLIGRAM(S): at 09:22

## 2017-11-07 RX ADMIN — PANTOPRAZOLE SODIUM 40 MILLIGRAM(S): 20 TABLET, DELAYED RELEASE ORAL at 08:43

## 2017-11-07 NOTE — PROGRESS NOTE ADULT - ATTENDING COMMENTS
Richardson Sy  Attending Physician   Division of Infectious Disease  Pager #[phone]  After 5pm/weekend #[phone]    I am away on 11/8 and will return 11/9. ID available #[phone].

## 2017-11-07 NOTE — PROGRESS NOTE ADULT - SUBJECTIVE AND OBJECTIVE BOX
Patient is a 27y old  Male who presents with a chief complaint of Cellulitis (05 Nov 2017 22:19)      SUBJECTIVE / OVERNIGHT EVENTS:  Painful episode while changing dressing yesterday  Afebrile  Review of Systems:   CONSTITUTIONAL: No fever, weight loss, or fatigue  EYES: No eye pain, visual disturbances, or discharge  ENMT:  No difficulty hearing, tinnitus, vertigo; No sinus or throat pain  NECK: No pain or stiffness  BREASTS: No pain, masses, or nipple discharge  RESPIRATORY: No cough, wheezing, chills or hemoptysis; No shortness of breath  CARDIOVASCULAR: No chest pain, palpitations, dizziness, or leg swelling  GASTROINTESTINAL: No abdominal or epigastric pain. No nausea, vomiting, or hematemesis; No diarrhea or constipation. No melena or hematochezia.  GENITOURINARY: No dysuria, frequency, hematuria, or incontinence  NEUROLOGICAL: No headaches, memory loss, loss of strength, numbness, or tremors  SKIN: No itching, burning, rashes, or lesions   LYMPH NODES: No enlarged glands  ENDOCRINE: No heat or cold intolerance; No hair loss  MUSCULOSKELETAL: No joint pain or swelling; No muscle, back, or extremity pain  PSYCHIATRIC: No depression, anxiety, mood swings, or difficulty sleeping  HEME/LYMPH: No easy bruising, or bleeding gums  ALLERY AND IMMUNOLOGIC: No hives or eczema    MEDICATIONS  (STANDING):  pantoprazole    Tablet 40 milliGRAM(s) Oral before breakfast  vancomycin  IVPB 1000 milliGRAM(s) IV Intermittent every 8 hours    MEDICATIONS  (PRN):  acetaminophen   Tablet 650 milliGRAM(s) Oral every 6 hours PRN For Temp greater than 38 C (100.4 F)      PHYSICAL EXAM:  Vital Signs Last 24 Hrs  T(C): 36.5 (07 Nov 2017 05:03), Max: 36.9 (06 Nov 2017 20:00)  T(F): 97.7 (07 Nov 2017 05:03), Max: 98.4 (06 Nov 2017 20:00)  HR: 65 (07 Nov 2017 05:03) (65 - 77)  BP: 113/73 (07 Nov 2017 05:03) (107/67 - 117/72)  BP(mean): --  RR: 18 (07 Nov 2017 05:03) (17 - 18)  SpO2: 99% (07 Nov 2017 05:03) (99% - 99%)  I&O's Summary    06 Nov 2017 07:01  -  07 Nov 2017 07:00  --------------------------------------------------------  IN: 800 mL / OUT: 0 mL / NET: 800 mL    07 Nov 2017 07:01  -  07 Nov 2017 12:13  --------------------------------------------------------  IN: 430 mL / OUT: 0 mL / NET: 430 mL      GENERAL: NAD, well-developed  HEAD:  Atraumatic, Normocephalic  EYES: EOMI, PERRLA, conjunctiva and sclera clear  NECK: Supple, No JVD  CHEST/LUNG: Clear to auscultation bilaterally; No wheeze  HEART: Regular rate and rhythm; No murmurs, rubs, or gallops  ABDOMEN: Soft, Nontender, Nondistended; Bowel sounds present  EXTREMITIES:  2+ Peripheral Pulses, No clubbing, cyanosis, or edema  PSYCH: AAOx3  NEUROLOGY: non-focal  SKIN: Right index finger dressed    LABS:  CAPILLARY BLOOD GLUCOSE                              14.6   6.3   )-----------( 289      ( 06 Nov 2017 06:19 )             42.2     11-06    139  |  102  |  13  ----------------------------<  100<H>  3.9   |  25  |  0.85    Ca    8.9      06 Nov 2017 06:19    TPro  8.5<H>  /  Alb  4.8  /  TBili  0.5  /  DBili  x   /  AST  43<H>  /  ALT  32  /  AlkPhos  56  11-05    PT/INR - ( 05 Nov 2017 14:58 )   PT: 14.5 sec;   INR: 1.33 ratio         PTT - ( 05 Nov 2017 14:58 )  PTT:28.5 sec          RADIOLOGY & ADDITIONAL TESTS:    Imaging Personally Reviewed:  < from: MR Upper Ext Non-joint w/wo IV Cont, Right (11.06.17 @ 22:19) >  1.  No activeosteomyelitis at this time.  2.  Soft tissue defect along the radial aspect of the dorsum of the index   finger with associated rim-enhancing subcutaneous cavity, which appears   to be filled with hypointense packing material.  3.  Extensive subcutaneous edema without discrete, drainable fluid   collection.    < end of copied text >    Consultant(s) Notes Reviewed:      Care Discussed with Consultants/Other Providers:

## 2017-11-07 NOTE — CONSULT NOTE ADULT - SUBJECTIVE AND OBJECTIVE BOX
28 yo R-handed M no PMHx p/w R index finger swelling for 6 days. Pt was in the ER on 11/4 and received IV antibiotics at [facility] and then was transferred to [facility] for further evaluation. The [facility] ER performed an irrigation and debridement and discharged the patient home on PO antibiotics. Patient was admitted for increasing pain and erythema. Denies fevers/chills,  Pt denies numbness tingling paresthesias in affected limb. denies any other orthopedic injuries at this time    PAST MEDICAL & SURGICAL HISTORY:  Abscess  No significant past surgical history    Allergies    No Known Allergies    Intolerances      MEDICATIONS  (STANDING):  pantoprazole    Tablet 40 milliGRAM(s) Oral before breakfast  vancomycin  IVPB 1000 milliGRAM(s) IV Intermittent every 8 hours                          14.6   6.3   )-----------( 289      ( 06 Nov 2017 06:19 )             42.2     11-06    139  |  102  |  13  ----------------------------<  100<H>  3.9   |  25  |  0.85    Ca    8.9      06 Nov 2017 06:19    TPro  8.5<H>  /  Alb  4.8  /  TBili  0.5  /  DBili  x   /  AST  43<H>  /  ALT  32  /  AlkPhos  56  11-05    PT/INR - ( 05 Nov 2017 14:58 )   PT: 14.5 sec;   INR: 1.33 ratio         PTT - ( 05 Nov 2017 14:58 )  PTT:28.5 sec    Imaging: ___ 5th metacarpal neck fracture    Vital Signs Last 24 Hrs  T(C): 36.6 (07 Nov 2017 12:14), Max: 36.9 (06 Nov 2017 20:00)  T(F): 97.9 (07 Nov 2017 12:14), Max: 98.4 (06 Nov 2017 20:00)  HR: 72 (07 Nov 2017 12:14) (65 - 77)  BP: 106/68 (07 Nov 2017 12:14) (106/68 - 113/73)  BP(mean): --  RR: 18 (07 Nov 2017 12:14) (17 - 18)  SpO2: 100% (07 Nov 2017 12:14) (99% - 100%)    PE:  Gen: NAD, AAOx3  RUE: I+D site with fibrinous exudate, no expressible purulence, no fluctance around the I+D site, erythema round the MCP and PIP of R dorsal aspect of index finger with no bony TTP at fingers/hand/wrist/elbow/shoulder, AIN/PIN/M/R/U/Msc/Ax, SILT C5-T1, +radial pusle, compartments soft.    Secondary Survey: Full ROM of unaffected extremities, SILT globally, compartments soft, no bony TTP over bony prominences, no calf TTP, able to SLR with legs, no TTP along axial spine    MRI 11/6  IMPRESSION:  1. No active osteomyelitis at this time.  2. Soft tissue defect along the radial aspect of the dorsum of the index  finger with associated rim-enhancing subcutaneous cavity, which appears to  be filled with hypointense packing material.  3. Extensive subcutaneous edema without discrete, drainable fluid  collection.    A/P 27y Male s/p I+D by [facility] ED on 11/4 admitted for increasing pain and erythema, no acute surgical intervention needed at this time, no acute abscess to drain, would continue with daily wet to dry dressing and IV abx per ID  pain control  care per primary team  please call back if patient not improving/abscess formation in need of drainage  discussed with attending

## 2017-11-07 NOTE — PROGRESS NOTE ADULT - PROBLEM SELECTOR PLAN 1
Failed out patient oral therapy with a limb threatening infection. Will need ID evaluation. Continue empiric Vanco for now to cover MRSA
Failed out patient oral therapy with a limb threatening infection. ID to FU. No OM seen on MRI
-change to ancef 1 gm iv q8  -ortho input noted  -hope to change to keflex in AM

## 2017-11-07 NOTE — PROGRESS NOTE ADULT - SUBJECTIVE AND OBJECTIVE BOX
ARACELI WEINBERG 27y [id number]    Patient is a 27y old  Male who presents with a chief complaint of Cellulitis (05 Nov 2017 22:19)      Follow Up/CC:  finger cellulitis    Interval History/ROS: feels ok    Allergies    No Known Allergies    Intolerances        ANTIMICROBIALS:  ceFAZolin   IVPB 1000 every 8 hours      MEDICATIONS  (STANDING):  ceFAZolin   IVPB 1000 milliGRAM(s) IV Intermittent every 8 hours  pantoprazole    Tablet 40 milliGRAM(s) Oral before breakfast    MEDICATIONS  (PRN):  acetaminophen   Tablet 650 milliGRAM(s) Oral every 6 hours PRN For Temp greater than 38 C (100.4 F)        Vital Signs Last 24 Hrs  T(C): 36.6 (07 Nov 2017 12:14), Max: 36.9 (06 Nov 2017 20:00)  T(F): 97.9 (07 Nov 2017 12:14), Max: 98.4 (06 Nov 2017 20:00)  HR: 72 (07 Nov 2017 12:14) (65 - 77)  BP: 106/68 (07 Nov 2017 12:14) (106/68 - 113/73)  BP(mean): --  RR: 18 (07 Nov 2017 12:14) (17 - 18)  SpO2: 100% (07 Nov 2017 12:14) (99% - 100%)    CBC Full  -  ( 06 Nov 2017 06:19 )  WBC Count : 6.3 K/uL  Hemoglobin : 14.6 g/dL  Hematocrit : 42.2 %  Platelet Count - Automated : 289 K/uL  Mean Cell Volume : 90.8 fl  Mean Cell Hemoglobin : 31.4 pg  Mean Cell Hemoglobin Concentration : 34.5 gm/dL  Auto Neutrophil # : 3.4 K/uL  Auto Lymphocyte # : 1.9 K/uL  Auto Monocyte # : 0.7 K/uL  Auto Eosinophil # : 0.2 K/uL  Auto Basophil # : 0.0 K/uL  Auto Neutrophil % : 54.3 %  Auto Lymphocyte % : 30.9 %  Auto Monocyte % : 11.0 %  Auto Eosinophil % : 3.0 %  Auto Basophil % : 0.7 %    11-06    139  |  102  |  13  ----------------------------<  100<H>  3.9   |  25  |  0.85    Ca    8.9      06 Nov 2017 06:19            MICROBIOLOGY:  .Blood Blood  11-06-17   No growth to date.  --  --      .Surgical Swab R index finger  11-04-17   Moderate Staphylococcus aureus  --  Staphylococcus aureus      .Blood Blood  11-04-17   No growth to date.  --  --      Vancomycin Level, Trough: 6.7 ug/mL (11-07-17 @ 01:53)      RADIOLOGY    MR Upper Ext Non-joint w/wo IV Cont, Right (11.06.17 @ 22:19) >  1.  No activeosteomyelitis at this time.  2.  Soft tissue defect along the radial aspect of the dorsum of the index   finger with associated rim-enhancing subcutaneous cavity, which appears   to be filled with hypointense packing material.  3.  Extensive subcutaneous edema without discrete, drainable fluid   collection.

## 2017-11-08 VITALS
TEMPERATURE: 98 F | OXYGEN SATURATION: 97 % | SYSTOLIC BLOOD PRESSURE: 103 MMHG | RESPIRATION RATE: 18 BRPM | DIASTOLIC BLOOD PRESSURE: 66 MMHG | HEART RATE: 70 BPM

## 2017-11-08 PROCEDURE — 96374 THER/PROPH/DIAG INJ IV PUSH: CPT

## 2017-11-08 PROCEDURE — 93971 EXTREMITY STUDY: CPT

## 2017-11-08 PROCEDURE — 85652 RBC SED RATE AUTOMATED: CPT

## 2017-11-08 PROCEDURE — 85610 PROTHROMBIN TIME: CPT

## 2017-11-08 PROCEDURE — 85027 COMPLETE CBC AUTOMATED: CPT

## 2017-11-08 PROCEDURE — 85730 THROMBOPLASTIN TIME PARTIAL: CPT

## 2017-11-08 PROCEDURE — 86901 BLOOD TYPING SEROLOGIC RH(D): CPT

## 2017-11-08 PROCEDURE — 80202 ASSAY OF VANCOMYCIN: CPT

## 2017-11-08 PROCEDURE — 80053 COMPREHEN METABOLIC PANEL: CPT

## 2017-11-08 PROCEDURE — 86850 RBC ANTIBODY SCREEN: CPT

## 2017-11-08 PROCEDURE — 87040 BLOOD CULTURE FOR BACTERIA: CPT

## 2017-11-08 PROCEDURE — 73130 X-RAY EXAM OF HAND: CPT

## 2017-11-08 PROCEDURE — 86900 BLOOD TYPING SEROLOGIC ABO: CPT

## 2017-11-08 PROCEDURE — 96375 TX/PRO/DX INJ NEW DRUG ADDON: CPT

## 2017-11-08 PROCEDURE — 80048 BASIC METABOLIC PNL TOTAL CA: CPT

## 2017-11-08 PROCEDURE — 99285 EMERGENCY DEPT VISIT HI MDM: CPT | Mod: 25

## 2017-11-08 PROCEDURE — 86140 C-REACTIVE PROTEIN: CPT

## 2017-11-08 PROCEDURE — 73220 MRI UPPR EXTREMITY W/O&W/DYE: CPT

## 2017-11-08 RX ORDER — PANTOPRAZOLE SODIUM 20 MG/1
1 TABLET, DELAYED RELEASE ORAL
Qty: 14 | Refills: 0 | OUTPATIENT
Start: 2017-11-08 | End: 2017-11-22

## 2017-11-08 RX ORDER — CEPHALEXIN 500 MG
500 CAPSULE ORAL EVERY 6 HOURS
Qty: 0 | Refills: 0 | Status: DISCONTINUED | OUTPATIENT
Start: 2017-11-08 | End: 2017-11-08

## 2017-11-08 RX ORDER — ACETAMINOPHEN 500 MG
2 TABLET ORAL
Qty: 0 | Refills: 0 | COMMUNITY
Start: 2017-11-08

## 2017-11-08 RX ORDER — ESOMEPRAZOLE MAGNESIUM 40 MG/1
1 CAPSULE, DELAYED RELEASE ORAL
Qty: 0 | Refills: 0 | COMMUNITY

## 2017-11-08 RX ORDER — CEPHALEXIN 500 MG
1 CAPSULE ORAL
Qty: 20 | Refills: 0 | OUTPATIENT
Start: 2017-11-08 | End: 2017-11-13

## 2017-11-08 RX ADMIN — Medication 100 MILLIGRAM(S): at 05:14

## 2017-11-08 RX ADMIN — PANTOPRAZOLE SODIUM 40 MILLIGRAM(S): 20 TABLET, DELAYED RELEASE ORAL at 05:16

## 2017-11-08 NOTE — DISCHARGE NOTE ADULT - MEDICATION SUMMARY - MEDICATIONS TO TAKE
I will START or STAY ON the medications listed below when I get home from the hospital:    acetaminophen 325 mg oral tablet  -- 2 tab(s) by mouth every 6 hours, As needed, For Temp greater than 38 C (100.4 F)  -- Indication: For pain mangement    cephalexin 500 mg oral capsule  -- 1 cap(s) by mouth every 6 hours  -- Indication: For Cellulitis    pantoprazole 40 mg oral delayed release tablet  -- 1 tab(s) by mouth once a day (before a meal)  -- Indication: For GI prophylaxis I will START or STAY ON the medications listed below when I get home from the hospital:    acetaminophen 325 mg oral tablet  -- 2 tab(s) by mouth every 6 hours, As needed, For Temp greater than 38 C (100.4 F)  -- Indication: For pain mangement    cephalexin 500 mg oral capsule  -- 1 cap(s) by mouth every 6 hours  -- Indication: For Cellulitis    NexIUM 20 mg oral delayed release capsule  -- 1 cap(s) by mouth once a day  -- Indication: For gerd

## 2017-11-08 NOTE — DISCHARGE NOTE ADULT - HOSPITAL COURSE
26 yo R-handed M no PMHx p/w R index finger swelling x 6 days to cellulitis vs ostemyelitis.   Xray R hand: soft tissue swelling at the radial aspect of the 2nd proximal phalanx. No displaced fracture.   MRI right hand: No active osteomyelitis at this time. Soft tissue defect along the radial aspect of the dorsum of the index finger with associated rim-enhancing subcutaneous cavity, which appears to be filled with hypointense packing material. Extensive subcutaneous edema without discrete, drainable fluid collection.  pt was on Ancef for 2 days and will c/w keflex 500mg Q6hr for total 7 day course. pt is stable to discharge to home now and will f/u with his PMD and hand surgeon within 1 ~ 2wks.

## 2017-11-08 NOTE — DISCHARGE NOTE ADULT - PLAN OF CARE
improved Staphylococcus aureus infection.  Take all of your antibiotics as ordered.  Call your Health Care Provider within two days of arriving home to make a follow up appointment within one week.  If the affected cellulitic area increases in redness, warmth, pain or swelling call your Health Care Provider.  If you develop fever, chills, and/or malaise, call your Health Care Provider. same as above. continue with keflex 500mg every 6hours for 5 days.  Staphylococcus aureus infection.  Take all of your antibiotics as ordered.  Call your Health Care Provider within two days of arriving home to make a follow up appointment within one week.  If the affected cellulitic area increases in redness, warmth, pain or swelling call your Health Care Provider.  If you develop fever, chills, and/or malaise, call your Health Care Provider. Continue with Nexium as directed.  SEEK IMMEDIATE MEDICAL CARE IF:  You have pain in your arms, neck, jaw, teeth, or back.   Your pain increases or changes in intensity or duration.   You develop nausea, vomiting, or sweating (diaphoresis).  You develop shortness of breath, or you faint.  Your vomit is green, yellow, black, or looks like coffee grounds or blood.  Your stool is red, bloody, or black.  These symptoms could be signs of other problems, such as heart disease, gastric bleeding, or esophageal bleeding.

## 2017-11-08 NOTE — DISCHARGE NOTE ADULT - CARE PLAN
Principal Discharge DX:	Cellulitis and abscess  Goal:	improved  Instructions for follow-up, activity and diet:	Staphylococcus aureus infection.  Take all of your antibiotics as ordered.  Call your Health Care Provider within two days of arriving home to make a follow up appointment within one week.  If the affected cellulitic area increases in redness, warmth, pain or swelling call your Health Care Provider.  If you develop fever, chills, and/or malaise, call your Health Care Provider.  Secondary Diagnosis:	Finger swelling  Instructions for follow-up, activity and diet:	same as above. Principal Discharge DX:	Cellulitis and abscess  Goal:	improved  Instructions for follow-up, activity and diet:	continue with keflex 500mg every 6hours for 5 days.  Staphylococcus aureus infection.  Take all of your antibiotics as ordered.  Call your Health Care Provider within two days of arriving home to make a follow up appointment within one week.  If the affected cellulitic area increases in redness, warmth, pain or swelling call your Health Care Provider.  If you develop fever, chills, and/or malaise, call your Health Care Provider.  Secondary Diagnosis:	Finger swelling  Instructions for follow-up, activity and diet:	same as above.  Secondary Diagnosis:	Gastroesophageal reflux disease, esophagitis presence not specified  Instructions for follow-up, activity and diet:	Continue with Nexium as directed.  SEEK IMMEDIATE MEDICAL CARE IF:  You have pain in your arms, neck, jaw, teeth, or back.   Your pain increases or changes in intensity or duration.   You develop nausea, vomiting, or sweating (diaphoresis).  You develop shortness of breath, or you faint.  Your vomit is green, yellow, black, or looks like coffee grounds or blood.  Your stool is red, bloody, or black.  These symptoms could be signs of other problems, such as heart disease, gastric bleeding, or esophageal bleeding.

## 2017-11-08 NOTE — DISCHARGE NOTE ADULT - PATIENT PORTAL LINK FT
“You can access the [facility] Patient Portal, offered by [facility], by registering with the following website: [email or web address]”

## 2017-11-08 NOTE — PROGRESS NOTE ADULT - SUBJECTIVE AND OBJECTIVE BOX
f/u:  cellulitis (05 Nov 2017 22:19)    Interval History/ROS:  much less pain.  no fever/chills.  no n/v/d.  Rest of ROS otherwise negative.    Allergies  No Known Allergies    ANTIMICROBIALS:    ceFAZolin   IVPB 1000 every 8 hours    MEDICATIONS  (STANDING):  acetaminophen   Tablet 650 every 6 hours PRN  pantoprazole    Tablet 40 before breakfast    Vital Signs Last 24 Hrs  T(F): 97.5 (11-08-17 @ 12:02), Max: 98.3 (11-07-17 @ 21:24)  HR: 70 (11-08-17 @ 12:02)  BP: 103/66 (11-08-17 @ 12:02)  RR: 18 (11-08-17 @ 12:02)  SpO2: 97% (11-08-17 @ 12:02) (97% - 99%)  Wt(kg): --    PHYSICAL EXAM:  General: non-toxic  HEAD/EYES: anicteric  Cardiovascular:   S1, S2  Respiratory:  clear bilaterally  GI:  soft, non-tender, normal bowel sounds  :  no beckham  Musculoskeletal:  wound, see below  Neurologic:  grossly non-focal  Skin:  R hand with wound, minimal swelling.  no erythema, no pus.  no malodor  Lymph: no lymphadenopathy  Psychiatric:  appropriate affect  Vascular:  no phlebitis    .Surgical Swab R index finger  11-04-17   Moderate Staphylococcus aureus  --  Staphylococcus aureus (mssa)    .Blood Blood  11-04-17   No growth to date.  --  --    RADIOLOGY  MR Upper Ext Non-joint w/wo IV Cont, Right (11.06.17 @ 22:19) >  1.  No active osteomyelitis at this time.  2.  Soft tissue defect along the radial aspect of the dorsum of the index finger with associated rim-enhancing subcutaneous cavity, which appears to be filled with hypointense packing material.  3.  Extensive subcutaneous edema without discrete, drainable fluid collection.

## 2017-11-08 NOTE — DISCHARGE NOTE ADULT - MEDICATION SUMMARY - MEDICATIONS TO STOP TAKING
I will STOP taking the medications listed below when I get home from the hospital:    Bactrim  mg-160 mg oral tablet  -- 1 tab(s) by mouth 2 times a day MDD:2 tabs  -- Avoid prolonged or excessive exposure to direct and/or artificial sunlight while taking this medication.  Finish all this medication unless otherwise directed by prescriber.  Medication should be taken with plenty of water.

## 2017-11-08 NOTE — DISCHARGE NOTE ADULT - CARE PROVIDER_API CALL
Guillermo Loyd (MD), Plastic Surgery  [address]  [place] [address]  Phone: [phone]  Fax: [phone]

## 2017-11-08 NOTE — PROGRESS NOTE ADULT - ASSESSMENT
26 yo R-handed M no PMHx p/w R index finger swelling x 6 days with finger cellulitis with elevated ESR and CRP with staph aureus infection.  MSSA.  minimal residual swelling.    okay from ID standpoint to change to po keflex 500mg 4xd for 5 days further.    I have discussed plan of care with consulting team ([id number])
26 yo R-handed M no PMHx p/w R index finger swelling x 6 days with finger cellulitis with elevated ESR and CRP with staph aureus infection

## 2017-11-09 LAB
CULTURE RESULTS: SIGNIFICANT CHANGE UP
ORGANISM # SPEC MICROSCOPIC CNT: SIGNIFICANT CHANGE UP
ORGANISM # SPEC MICROSCOPIC CNT: SIGNIFICANT CHANGE UP
SPECIMEN SOURCE: SIGNIFICANT CHANGE UP

## 2017-11-11 LAB
CULTURE RESULTS: SIGNIFICANT CHANGE UP
CULTURE RESULTS: SIGNIFICANT CHANGE UP
SPECIMEN SOURCE: SIGNIFICANT CHANGE UP
SPECIMEN SOURCE: SIGNIFICANT CHANGE UP

## 2018-06-05 NOTE — ED PROVIDER NOTE - NSCAREINITIATED _GEN_ER
Contacted glenny, informed of Dr. Marcos's patient being in the ED, agreed to come see patient Morelia Beyer(Attending) PT seen resting comfortably in no acute distress, no acute complaints at this time, PT tolerating PO intake,  PT informed of all results, PT evaluated by ortho Dr. Roman instructed to DC home with pain medications, pain to be non emergent at this time pt given good precautions, follow up in the office this week educated about when to return to the ED if needed. PT verbalizes that he understands all instructions and results.

## 2021-02-09 NOTE — CONSULT NOTE ADULT - EXTREMITIES
Addended by: DOMINGO CRANE on: 2/9/2021 03:26 PM     Modules accepted: Orders     No cyanosis, clubbing or edema